# Patient Record
Sex: MALE | URBAN - METROPOLITAN AREA
[De-identification: names, ages, dates, MRNs, and addresses within clinical notes are randomized per-mention and may not be internally consistent; named-entity substitution may affect disease eponyms.]

---

## 2024-11-11 ENCOUNTER — APPOINTMENT (OUTPATIENT)
Dept: URBAN - METROPOLITAN AREA CLINIC 314 | Age: 44
Setting detail: DERMATOLOGY
End: 2024-11-21

## 2024-11-11 DIAGNOSIS — L63.8 OTHER ALOPECIA AREATA: ICD-10-CM

## 2024-11-11 PROCEDURE — OTHER PRESCRIPTION MEDICATION MANAGEMENT: OTHER

## 2024-11-11 PROCEDURE — 99203 OFFICE O/P NEW LOW 30 MIN: CPT

## 2024-11-11 PROCEDURE — OTHER COUNSELING: OTHER

## 2024-11-11 PROCEDURE — OTHER PRESCRIPTION: OTHER

## 2024-11-11 PROCEDURE — OTHER MIPS QUALITY: OTHER

## 2024-11-11 PROCEDURE — OTHER ADDITIONAL NOTES: OTHER

## 2024-11-11 RX ORDER — CLOBETASOL PROPIONATE 0.5 MG/G
GEL TOPICAL
Qty: 30 | Refills: 2 | Status: ERX | COMMUNITY
Start: 2024-11-11

## 2024-11-11 ASSESSMENT — LOCATION DETAILED DESCRIPTION DERM: LOCATION DETAILED: LEFT CENTRAL PARIETAL SCALP

## 2024-11-11 ASSESSMENT — LOCATION SIMPLE DESCRIPTION DERM: LOCATION SIMPLE: SCALP

## 2024-11-11 ASSESSMENT — LOCATION ZONE DERM: LOCATION ZONE: SCALP

## 2024-11-11 NOTE — PROCEDURE: PRESCRIPTION MEDICATION MANAGEMENT
Detail Level: Zone
Initiate Treatment: -\\nclobetasol 0.05 % topical gel \\nQuantity: 30.0 g  Days Supply: 30\\nSig: Apply to affected areas on scalp twice daily for 1 month
Render In Strict Bullet Format?: No

## 2024-11-11 NOTE — PROCEDURE: ADDITIONAL NOTES
Render Risk Assessment In Note?: no
Additional Notes: Pt reports no illness or stress (outside of work).\\nNot scarring today.\\nDiscussed ILK injections vs topical medications.\\n\\nPt reports getting physical within last 3 months, will check if thyroid test was done.\\nPt elected to try topicals first.
Detail Level: Simple

## 2024-11-11 NOTE — HPI: HAIR LOSS
How Did The Hair Loss Occur?: sudden in onset
Rt in ccr for breathing treatment  
How Severe Is Your Hair Loss?: moderate
Additional History: Pt reports bald spot on left side of scalp